# Patient Record
(demographics unavailable — no encounter records)

---

## 2025-07-14 NOTE — PHYSICAL EXAM
[Well Developed] : well developed [Well Nourished] : well nourished [No Acute Distress] : no acute distress [Normal Conjunctiva] : normal conjunctiva [Normal S1, S2] : normal S1, S2 [Clear Lung Fields] : clear lung fields [Soft] : abdomen soft [Normal Gait] : normal gait [No Edema] : no edema [No Rash] : no rash [Moves all extremities] : moves all extremities [No Focal Deficits] : no focal deficits [Alert and Oriented] : alert and oriented

## 2025-07-15 NOTE — ASSESSMENT
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 He was persistently in AF in April 2025 (newly diagnosed), EKG today showed SR. Echo showed mildly dilated LA  [id number] (HTN, DM) ~ 2% annual stroke risk. Compliant with Eliquis.   We discussed anatomy and pathophysiology of AF, as well as medical therapy and ablation. I believe the best early rhythm control would be PFA. We discussed procedure details and potential low risks of hematoma, bleeding, vascular injury, stroke, pericardial effusion/tamponade. We also went over the postop recovery process.   Call tomorrow to ask for their decision and schedule procedure. he will hold farxiga for 3 days and all other DM medications the day of procedure

## 2025-07-15 NOTE — REVIEW OF SYSTEMS
[Dyspnea on exertion] : dyspnea during exertion [Chest Discomfort] : chest discomfort [Palpitations] : palpitations [Easy Bleeding] : a tendency for easy bleeding [Easy Bruising] : a tendency for easy bruising [Fever] : no fever [Chills] : no chills [SOB] : no shortness of breath [Lower Ext Edema] : no extremity edema [Syncope] : no syncope [Rash] : no rash [Dizziness] : no dizziness

## 2025-07-15 NOTE — HISTORY OF PRESENT ILLNESS
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   Patient has noted intermittent palpitation throughout day, worst at night. First diagnosed in April 2025, mostly rate controlled on toprol 50. started on Eliquis 5mg bid. 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   EKG 7/14/25: SR @ 63 EKG 4/15/25: AF@ 85 EKG 4/15/25 OSH: AF All prior EKGs are SR  TTE 4/15/25: EF 58%, mildly dilated LA, normal RV function, trace MR/TR, no PH.   NStress 10/6/24: negative for ischemia  1 week Holter May 2022: no arrhythmia.  Eliquis 5 mg tablet (apixaban) Take 1 tablet by mouth twice a day Toprol XL 50 mg tablet,extended release (metoprolol succinate) Take 1 tablet by mouth once a day Nesina 12.5 mg tablet (alogliptin) Take 1 tablet by mouth once a day  Crestor 5 mg tablet (rosuvastatin) Take 1 tablet by mouth once a day  Avapro 150 mg tablet (irbesartan) 1 tablet by mouth once a day  tamsulosin 0.4 mg capsule (tamsulosin) 1 tablet by mouth every night  finasteride 5 mg tablet (finasteride) 1 tablet by mouth every night  metformin 500 mg tablet (metformin) 1 tablet by mouth twice a day  Farxiga 10 mg tablet (dapagliflozin) 1 tablet by mouth once a day

## 2025-07-15 NOTE — HISTORY OF PRESENT ILLNESS
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   Patient has noted intermittent palpitation throughout day, worst at night. First diagnosed in April 2025, mostly rate controlled on toprol 50. started on Eliquis 5mg bid. 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   EKG 7/14/25: SR @ 63 EKG 4/15/25: AF@ 85 EKG 4/15/25 OSH: AF All prior EKGs are SR  TTE 4/15/25: EF 58%, mildly dilated LA, normal RV function, trace MR/TR, no PH.   NStress 10/6/24: negative for ischemia  1 week Holter May 2022: no arrhythmia.  Eliquis 5 mg tablet (apixaban) Take 1 tablet by mouth twice a day Toprol XL 50 mg tablet,extended release (metoprolol succinate) Take 1 tablet by mouth once a day Nesina 12.5 mg tablet (alogliptin) Take 1 tablet by mouth once a day  Crestor 5 mg tablet (rosuvastatin) Take 1 tablet by mouth once a day  Avapro 150 mg tablet (irbesartan) 1 tablet by mouth once a day  tamsulosin 0.4 mg capsule (tamsulosin) 1 tablet by mouth every night  finasteride 5 mg tablet (finasteride) 1 tablet by mouth every night  metformin 500 mg tablet (metformin) 1 tablet by mouth twice a day  Farxiga 10 mg tablet (dapagliflozin) 1 tablet by mouth once a day

## 2025-07-15 NOTE — ASSESSMENT
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 He was persistently in AF in April 2025 (newly diagnosed), EKG today showed SR. Echo showed mildly dilated LA  [id number] (HTN, DM) ~ 2% annual stroke risk. Compliant with Eliquis.   We discussed anatomy and pathophysiology of AF, as well as medical therapy and ablation. I believe the best early rhythm control would be PFA. We discussed procedure details and potential low risks of hematoma, bleeding, vascular injury, stroke, pericardial effusion/tamponade. We also went over the postop recovery process.   Call tomorrow to ask for their decision and schedule procedure. he will hold farxiga for 3 days and all other DM medications the day of procedure